# Patient Record
Sex: MALE | URBAN - METROPOLITAN AREA
[De-identification: names, ages, dates, MRNs, and addresses within clinical notes are randomized per-mention and may not be internally consistent; named-entity substitution may affect disease eponyms.]

---

## 2022-04-26 ENCOUNTER — OUT OF OFFICE VISIT (OUTPATIENT)
Dept: URBAN - METROPOLITAN AREA MEDICAL CENTER 9 | Facility: MEDICAL CENTER | Age: 67
End: 2022-04-26
Payer: MEDICARE

## 2022-04-26 DIAGNOSIS — D64.89 ANEMIA DUE TO OTHER CAUSE: ICD-10-CM

## 2022-04-26 DIAGNOSIS — K25.4 BLEEDING GASTRIC ULCER: ICD-10-CM

## 2022-04-26 DIAGNOSIS — K29.30 CHRONIC SUPERFICIAL GASTRITIS: ICD-10-CM

## 2022-04-26 DIAGNOSIS — D69.6 ACQUIRED AMEGAKARYOCYTIC THROMBOCYTOPENIA: ICD-10-CM

## 2022-04-26 DIAGNOSIS — K31.A12 INTESTINAL METAPLASIA OF BODY OF STOMACH WITHOUT DYSPLASIA: ICD-10-CM

## 2022-04-26 DIAGNOSIS — K29.80 ACUTE DUODENITIS: ICD-10-CM

## 2022-04-26 DIAGNOSIS — K92.1 ACUTE MELENA: ICD-10-CM

## 2022-04-26 DIAGNOSIS — K92.0 BLOODY EMESIS: ICD-10-CM

## 2022-04-26 PROCEDURE — 99221 1ST HOSP IP/OBS SF/LOW 40: CPT | Performed by: INTERNAL MEDICINE

## 2022-04-26 PROCEDURE — 43239 EGD BIOPSY SINGLE/MULTIPLE: CPT | Performed by: INTERNAL MEDICINE

## 2022-04-26 PROCEDURE — G8427 DOCREV CUR MEDS BY ELIG CLIN: HCPCS | Performed by: INTERNAL MEDICINE

## 2022-04-26 PROCEDURE — 43255 EGD CONTROL BLEEDING ANY: CPT | Performed by: INTERNAL MEDICINE

## 2022-04-26 PROCEDURE — 99232 SBSQ HOSP IP/OBS MODERATE 35: CPT | Performed by: INTERNAL MEDICINE

## 2022-04-29 ENCOUNTER — OUT OF OFFICE VISIT (OUTPATIENT)
Dept: URBAN - METROPOLITAN AREA MEDICAL CENTER 9 | Facility: MEDICAL CENTER | Age: 67
End: 2022-04-29
Payer: MEDICARE

## 2022-04-29 DIAGNOSIS — D69.6 ACQUIRED AMEGAKARYOCYTIC THROMBOCYTOPENIA: ICD-10-CM

## 2022-04-29 DIAGNOSIS — K25.4 BLEEDING GASTRIC ULCER: ICD-10-CM

## 2022-04-29 DIAGNOSIS — D64.89 ANEMIA DUE TO OTHER CAUSE: ICD-10-CM

## 2022-04-29 PROCEDURE — 99232 SBSQ HOSP IP/OBS MODERATE 35: CPT | Performed by: PHYSICIAN ASSISTANT

## 2022-06-23 ENCOUNTER — OFFICE VISIT (OUTPATIENT)
Dept: URBAN - METROPOLITAN AREA CLINIC 40 | Facility: CLINIC | Age: 67
End: 2022-06-23
Payer: MEDICARE

## 2022-06-23 ENCOUNTER — WEB ENCOUNTER (OUTPATIENT)
Dept: URBAN - METROPOLITAN AREA CLINIC 40 | Facility: CLINIC | Age: 67
End: 2022-06-23

## 2022-06-23 ENCOUNTER — LAB OUTSIDE AN ENCOUNTER (OUTPATIENT)
Dept: URBAN - METROPOLITAN AREA CLINIC 40 | Facility: CLINIC | Age: 67
End: 2022-06-23

## 2022-06-23 VITALS
DIASTOLIC BLOOD PRESSURE: 60 MMHG | WEIGHT: 123 LBS | HEIGHT: 76 IN | SYSTOLIC BLOOD PRESSURE: 108 MMHG | TEMPERATURE: 98 F | HEART RATE: 100 BPM | BODY MASS INDEX: 14.98 KG/M2

## 2022-06-23 DIAGNOSIS — R10.13 EPIGASTRIC ABDOMINAL PAIN: ICD-10-CM

## 2022-06-23 DIAGNOSIS — K25.4 CHRONIC GASTRIC ULCER WITH HEMORRHAGE: ICD-10-CM

## 2022-06-23 PROCEDURE — 99214 OFFICE O/P EST MOD 30 MIN: CPT | Performed by: INTERNAL MEDICINE

## 2022-06-23 RX ORDER — PANTOPRAZOLE SODIUM 40 MG/1
1 TABLET TABLET, DELAYED RELEASE ORAL ONCE A DAY
Qty: 90 | Refills: 3 | OUTPATIENT
Start: 2022-06-23

## 2022-06-23 RX ORDER — BUDESONIDE AND FORMOTEROL FUMARATE DIHYDRATE 160; 4.5 UG/1; UG/1
2 PUFFS AEROSOL RESPIRATORY (INHALATION) ONCE A DAY
Qty: 1 KIT | Refills: 3

## 2022-06-23 NOTE — HPI-TODAY'S VISIT:
66-year-old -American male here for follow-up after recent hospitalization for GI bleed.  He was in the hospital in April with melena and hematemesis.  Was noted to have with bleeding. had gastric ulcer.  Hemostasis was achieved with Hemoclip and epinephrine.  He has done well since discharge.  Just ran out of Protonix and Carafate.  He is in need of a new PCP as Dr. Keene does not accept his insurance anymore.  He has COPD.  Still smokes.  Not on oxygen.  Up-to-date on colonoscopy.  Colonoscopy in 2016 showed diverticulosis and internal hemorrhoids.

## 2022-07-15 PROBLEM — 57246001: Status: ACTIVE | Noted: 2022-06-23

## 2022-08-10 ENCOUNTER — OFFICE VISIT (OUTPATIENT)
Dept: URBAN - METROPOLITAN AREA SURGERY CENTER 30 | Facility: SURGERY CENTER | Age: 67
End: 2022-08-10
Payer: MEDICARE

## 2022-08-10 DIAGNOSIS — R12 BURNING REFLUX: ICD-10-CM

## 2022-08-10 DIAGNOSIS — K29.30 CHRONIC SUPERFICIAL GASTRITIS: ICD-10-CM

## 2022-08-10 PROCEDURE — G8907 PT DOC NO EVENTS ON DISCHARG: HCPCS | Performed by: INTERNAL MEDICINE

## 2022-08-10 PROCEDURE — 43239 EGD BIOPSY SINGLE/MULTIPLE: CPT | Performed by: INTERNAL MEDICINE

## 2022-10-05 ENCOUNTER — OFFICE VISIT (OUTPATIENT)
Dept: URBAN - METROPOLITAN AREA CLINIC 40 | Facility: CLINIC | Age: 67
End: 2022-10-05

## 2023-08-07 ENCOUNTER — CLAIMS CREATED FROM THE CLAIM WINDOW (OUTPATIENT)
Dept: URBAN - METROPOLITAN AREA MEDICAL CENTER 9 | Facility: MEDICAL CENTER | Age: 68
End: 2023-08-07

## 2023-08-07 ENCOUNTER — CLAIMS CREATED FROM THE CLAIM WINDOW (OUTPATIENT)
Dept: URBAN - METROPOLITAN AREA MEDICAL CENTER 9 | Facility: MEDICAL CENTER | Age: 68
End: 2023-08-07
Payer: MEDICARE

## 2023-08-07 DIAGNOSIS — K92.0 HEMATEMESIS: ICD-10-CM

## 2023-08-07 DIAGNOSIS — K25.9 GASTRIC ULCER, UNSPECIFIED AS ACUTE OR CHRONIC, WITHOUT HEMORRHAGE OR PERFORATION: ICD-10-CM

## 2023-08-07 DIAGNOSIS — K29.60 ADENOPAPILLOMATOSIS GASTRICA: ICD-10-CM

## 2023-08-07 DIAGNOSIS — K92.1 MELENA: ICD-10-CM

## 2023-08-07 DIAGNOSIS — Z87.11 H/O GASTRIC ULCER: ICD-10-CM

## 2023-08-07 DIAGNOSIS — K22.2 ACQUIRED ESOPHAGEAL RING: ICD-10-CM

## 2023-08-07 DIAGNOSIS — K25.9 ANTRAL ULCER: ICD-10-CM

## 2023-08-07 DIAGNOSIS — K29.70 GASTRITIS, UNSPECIFIED, WITHOUT BLEEDING: ICD-10-CM

## 2023-08-07 PROCEDURE — 43255 EGD CONTROL BLEEDING ANY: CPT | Performed by: INTERNAL MEDICINE

## 2023-08-07 PROCEDURE — 99222 1ST HOSP IP/OBS MODERATE 55: CPT | Performed by: INTERNAL MEDICINE

## 2023-08-07 PROCEDURE — 99254 IP/OBS CNSLTJ NEW/EST MOD 60: CPT | Performed by: INTERNAL MEDICINE

## 2023-08-07 PROCEDURE — 43239 EGD BIOPSY SINGLE/MULTIPLE: CPT | Performed by: INTERNAL MEDICINE

## 2023-08-07 PROCEDURE — 43236 UPPR GI SCOPE W/SUBMUC INJ: CPT | Performed by: INTERNAL MEDICINE

## 2023-08-07 PROCEDURE — G8427 DOCREV CUR MEDS BY ELIG CLIN: HCPCS | Performed by: INTERNAL MEDICINE

## 2023-08-08 ENCOUNTER — CLAIMS CREATED FROM THE CLAIM WINDOW (OUTPATIENT)
Dept: URBAN - METROPOLITAN AREA MEDICAL CENTER 9 | Facility: MEDICAL CENTER | Age: 68
End: 2023-08-08
Payer: MEDICARE

## 2023-08-08 DIAGNOSIS — K25.9 ANTRAL ULCER: ICD-10-CM

## 2023-08-08 DIAGNOSIS — K92.0 BLOODY EMESIS: ICD-10-CM

## 2023-08-08 DIAGNOSIS — K22.2 ACQUIRED ESOPHAGEAL RING: ICD-10-CM

## 2023-08-08 PROCEDURE — 99254 IP/OBS CNSLTJ NEW/EST MOD 60: CPT | Performed by: INTERNAL MEDICINE

## 2023-08-08 PROCEDURE — 99232 SBSQ HOSP IP/OBS MODERATE 35: CPT | Performed by: INTERNAL MEDICINE

## 2023-08-09 ENCOUNTER — TELEPHONE ENCOUNTER (OUTPATIENT)
Dept: URBAN - METROPOLITAN AREA CLINIC 74 | Facility: CLINIC | Age: 68
End: 2023-08-09

## 2023-08-11 ENCOUNTER — P2P PATIENT RECORD (OUTPATIENT)
Age: 68
End: 2023-08-11

## 2023-09-07 ENCOUNTER — OFFICE VISIT (OUTPATIENT)
Dept: URBAN - METROPOLITAN AREA CLINIC 40 | Facility: CLINIC | Age: 68
End: 2023-09-07

## 2023-09-07 RX ORDER — BUDESONIDE AND FORMOTEROL FUMARATE DIHYDRATE 160; 4.5 UG/1; UG/1
2 PUFFS AEROSOL RESPIRATORY (INHALATION) ONCE A DAY
Qty: 1 KIT | Refills: 3 | Status: ACTIVE | COMMUNITY

## 2023-09-07 RX ORDER — PANTOPRAZOLE SODIUM 40 MG/1
1 TABLET TABLET, DELAYED RELEASE ORAL ONCE A DAY
Qty: 90 | Refills: 3 | Status: ACTIVE | COMMUNITY
Start: 2022-06-23

## 2023-11-15 ENCOUNTER — DASHBOARD ENCOUNTERS (OUTPATIENT)
Age: 68
End: 2023-11-15

## 2023-11-16 ENCOUNTER — OFFICE VISIT (OUTPATIENT)
Dept: URBAN - METROPOLITAN AREA CLINIC 40 | Facility: CLINIC | Age: 68
End: 2023-11-16

## 2024-07-25 ENCOUNTER — CLAIMS CREATED FROM THE CLAIM WINDOW (OUTPATIENT)
Dept: URBAN - METROPOLITAN AREA MEDICAL CENTER 9 | Facility: MEDICAL CENTER | Age: 69
End: 2024-07-25
Payer: MEDICARE

## 2024-07-25 DIAGNOSIS — K92.0 HEMATEMESIS: ICD-10-CM

## 2024-07-25 DIAGNOSIS — D64.9 ANEMIA, UNSPECIFIED: ICD-10-CM

## 2024-07-25 DIAGNOSIS — K92.2 GASTROINTESTINAL HEMORRHAGE, UNSPECIFIED: ICD-10-CM

## 2024-07-25 DIAGNOSIS — K92.1 MELENA: ICD-10-CM

## 2024-07-25 PROCEDURE — 99254 IP/OBS CNSLTJ NEW/EST MOD 60: CPT | Performed by: INTERNAL MEDICINE

## 2024-07-25 PROCEDURE — 43255 EGD CONTROL BLEEDING ANY: CPT | Performed by: INTERNAL MEDICINE

## 2024-07-26 ENCOUNTER — CLAIMS CREATED FROM THE CLAIM WINDOW (OUTPATIENT)
Dept: URBAN - METROPOLITAN AREA MEDICAL CENTER 9 | Facility: MEDICAL CENTER | Age: 69
End: 2024-07-26
Payer: MEDICARE

## 2024-07-26 DIAGNOSIS — K27.0 ACUTE PEPTIC ULCER, SITE UNSPECIFIED, WITH HEMORRHAGE: ICD-10-CM

## 2024-07-26 DIAGNOSIS — K92.0 HEMATEMESIS: ICD-10-CM

## 2024-07-26 DIAGNOSIS — D64.9 ANEMIA, UNSPECIFIED: ICD-10-CM

## 2024-07-26 PROCEDURE — 99231 SBSQ HOSP IP/OBS SF/LOW 25: CPT | Performed by: PHYSICIAN ASSISTANT

## 2024-07-29 ENCOUNTER — CLAIMS CREATED FROM THE CLAIM WINDOW (OUTPATIENT)
Dept: URBAN - METROPOLITAN AREA MEDICAL CENTER 9 | Facility: MEDICAL CENTER | Age: 69
End: 2024-07-29

## 2024-07-29 PROCEDURE — 99231 SBSQ HOSP IP/OBS SF/LOW 25: CPT | Performed by: PHYSICIAN ASSISTANT

## 2024-07-30 ENCOUNTER — CLAIMS CREATED FROM THE CLAIM WINDOW (OUTPATIENT)
Dept: URBAN - METROPOLITAN AREA MEDICAL CENTER 9 | Facility: MEDICAL CENTER | Age: 69
End: 2024-07-30
Payer: MEDICARE

## 2024-07-30 DIAGNOSIS — K59.00 CONSTIPATION, UNSPECIFIED: ICD-10-CM

## 2024-07-30 DIAGNOSIS — D64.9 ANEMIA, UNSPECIFIED: ICD-10-CM

## 2024-07-30 DIAGNOSIS — K27.3 ACUTE PEPTIC ULCER, SITE UNSPECIFIED, WITHOUT HEMORRHAGE OR PERFORATION: ICD-10-CM

## 2024-07-30 PROCEDURE — 99231 SBSQ HOSP IP/OBS SF/LOW 25: CPT | Performed by: PHYSICIAN ASSISTANT

## 2024-09-30 ENCOUNTER — OFFICE VISIT (OUTPATIENT)
Dept: URBAN - METROPOLITAN AREA CLINIC 40 | Facility: CLINIC | Age: 69
End: 2024-09-30

## 2024-09-30 RX ORDER — PANTOPRAZOLE SODIUM 40 MG/1
1 TABLET TABLET, DELAYED RELEASE ORAL ONCE A DAY
Qty: 90 | Refills: 3 | Status: ACTIVE | COMMUNITY
Start: 2022-06-23

## 2024-09-30 RX ORDER — BUDESONIDE AND FORMOTEROL FUMARATE DIHYDRATE 160; 4.5 UG/1; UG/1
2 PUFFS AEROSOL RESPIRATORY (INHALATION) ONCE A DAY
Qty: 1 KIT | Refills: 3 | Status: ACTIVE | COMMUNITY

## 2024-11-12 ENCOUNTER — OFFICE VISIT (OUTPATIENT)
Dept: URBAN - METROPOLITAN AREA CLINIC 40 | Facility: CLINIC | Age: 69
End: 2024-11-12

## 2024-11-15 ENCOUNTER — CLAIMS CREATED FROM THE CLAIM WINDOW (OUTPATIENT)
Dept: URBAN - METROPOLITAN AREA MEDICAL CENTER 9 | Facility: MEDICAL CENTER | Age: 69
End: 2024-11-15
Payer: COMMERCIAL

## 2024-11-15 DIAGNOSIS — K25.4 CHRONIC OR UNSPECIFIED GASTRIC ULCER WITH HEMORRHAGE: ICD-10-CM

## 2024-11-15 DIAGNOSIS — K25.4 BLEEDING CHRONIC GASTRIC ULCER: ICD-10-CM

## 2024-11-15 DIAGNOSIS — K92.0 HEMATEMESIS: ICD-10-CM

## 2024-11-15 DIAGNOSIS — K92.2 GASTROINTESTINAL HEMORRHAGE, UNSPECIFIED: ICD-10-CM

## 2024-11-15 DIAGNOSIS — K92.1 MELENA: ICD-10-CM

## 2024-11-15 DIAGNOSIS — K22.2 ACQUIRED ESOPHAGEAL RING: ICD-10-CM

## 2024-11-15 PROCEDURE — 99254 IP/OBS CNSLTJ NEW/EST MOD 60: CPT

## 2024-11-15 PROCEDURE — 43255 EGD CONTROL BLEEDING ANY: CPT | Performed by: INTERNAL MEDICINE

## 2024-11-16 ENCOUNTER — CLAIMS CREATED FROM THE CLAIM WINDOW (OUTPATIENT)
Dept: URBAN - METROPOLITAN AREA MEDICAL CENTER 9 | Facility: MEDICAL CENTER | Age: 69
End: 2024-11-16
Payer: COMMERCIAL

## 2024-11-16 DIAGNOSIS — K25.4 BLEEDING CHRONIC GASTRIC ULCER: ICD-10-CM

## 2024-11-16 PROCEDURE — 99232 SBSQ HOSP IP/OBS MODERATE 35: CPT | Performed by: INTERNAL MEDICINE

## 2024-11-17 ENCOUNTER — CLAIMS CREATED FROM THE CLAIM WINDOW (OUTPATIENT)
Dept: URBAN - METROPOLITAN AREA MEDICAL CENTER 9 | Facility: MEDICAL CENTER | Age: 69
End: 2024-11-17
Payer: COMMERCIAL

## 2024-11-17 DIAGNOSIS — K25.4 BLEEDING CHRONIC GASTRIC ULCER: ICD-10-CM

## 2024-11-17 PROCEDURE — 99232 SBSQ HOSP IP/OBS MODERATE 35: CPT | Performed by: INTERNAL MEDICINE

## 2024-11-18 ENCOUNTER — CLAIMS CREATED FROM THE CLAIM WINDOW (OUTPATIENT)
Dept: URBAN - METROPOLITAN AREA MEDICAL CENTER 9 | Facility: MEDICAL CENTER | Age: 69
End: 2024-11-18
Payer: COMMERCIAL

## 2024-11-18 DIAGNOSIS — D64.9 ANEMIA, UNSPECIFIED: ICD-10-CM

## 2024-11-18 DIAGNOSIS — Z87.11 PERSONAL HISTORY OF PEPTIC ULCER DISEASE: ICD-10-CM

## 2024-11-18 DIAGNOSIS — K92.1 MELENA: ICD-10-CM

## 2024-11-18 PROCEDURE — 99232 SBSQ HOSP IP/OBS MODERATE 35: CPT | Performed by: PHYSICIAN ASSISTANT

## 2024-11-22 ENCOUNTER — TELEPHONE ENCOUNTER (OUTPATIENT)
Dept: URBAN - METROPOLITAN AREA CLINIC 40 | Facility: CLINIC | Age: 69
End: 2024-11-22

## 2024-11-27 ENCOUNTER — OFFICE VISIT (OUTPATIENT)
Dept: URBAN - METROPOLITAN AREA CLINIC 40 | Facility: CLINIC | Age: 69
End: 2024-11-27

## 2024-12-06 ENCOUNTER — TELEPHONE ENCOUNTER (OUTPATIENT)
Dept: URBAN - METROPOLITAN AREA CLINIC 40 | Facility: CLINIC | Age: 69
End: 2024-12-06

## 2024-12-11 ENCOUNTER — OFFICE VISIT (OUTPATIENT)
Dept: URBAN - METROPOLITAN AREA CLINIC 40 | Facility: CLINIC | Age: 69
End: 2024-12-11

## 2024-12-11 RX ORDER — PANTOPRAZOLE SODIUM 40 MG/1
1 TABLET 1/2 TO 1 HOUR BEFORE MORNING MEAL TABLET, DELAYED RELEASE ORAL ONCE A DAY
Status: ACTIVE | COMMUNITY

## 2024-12-11 RX ORDER — BUDESONIDE AND FORMOTEROL FUMARATE DIHYDRATE 160; 4.5 UG/1; UG/1
AS DIRECTED AEROSOL RESPIRATORY (INHALATION)
Status: ACTIVE | COMMUNITY

## 2024-12-11 RX ORDER — SUCRALFATE 1 G/1
1 TABLET ON AN EMPTY STOMACH TABLET ORAL TWICE A DAY
Status: ACTIVE | COMMUNITY

## 2024-12-11 RX ORDER — MIDODRINE HYDROCHLORIDE 10 MG/1
1 TABLET TABLET ORAL TWICE A DAY
Status: ACTIVE | COMMUNITY

## 2024-12-12 ENCOUNTER — TELEPHONE ENCOUNTER (OUTPATIENT)
Dept: URBAN - METROPOLITAN AREA CLINIC 5 | Facility: CLINIC | Age: 69
End: 2024-12-12

## 2024-12-26 ENCOUNTER — TELEPHONE ENCOUNTER (OUTPATIENT)
Dept: URBAN - METROPOLITAN AREA CLINIC 74 | Facility: CLINIC | Age: 69
End: 2024-12-26

## 2024-12-26 ENCOUNTER — OFFICE VISIT (OUTPATIENT)
Dept: URBAN - METROPOLITAN AREA CLINIC 40 | Facility: CLINIC | Age: 69
End: 2024-12-26
Payer: MEDICARE

## 2024-12-26 VITALS
SYSTOLIC BLOOD PRESSURE: 142 MMHG | WEIGHT: 127.2 LBS | HEIGHT: 76 IN | TEMPERATURE: 97.7 F | BODY MASS INDEX: 15.49 KG/M2 | DIASTOLIC BLOOD PRESSURE: 90 MMHG | HEART RATE: 102 BPM

## 2024-12-26 DIAGNOSIS — K29.21 CHRONIC ALCOHOLIC GASTRITIS WITH HEMORRHAGE: ICD-10-CM

## 2024-12-26 DIAGNOSIS — K25.4 CHRONIC GASTRIC ULCER WITH HEMORRHAGE: ICD-10-CM

## 2024-12-26 PROBLEM — 40241000119109: Status: ACTIVE | Noted: 2024-12-26

## 2024-12-26 PROCEDURE — 99204 OFFICE O/P NEW MOD 45 MIN: CPT

## 2024-12-26 RX ORDER — SUCRALFATE 1 G/1
1 TABLET ON AN EMPTY STOMACH TABLET ORAL TWICE A DAY
COMMUNITY

## 2024-12-26 RX ORDER — PANTOPRAZOLE SODIUM 40 MG/1
1 TABLET TABLET, DELAYED RELEASE ORAL ONCE A DAY
Qty: 90 | Refills: 3 | COMMUNITY
Start: 2022-06-23

## 2024-12-26 RX ORDER — SUCRALFATE 1 G/1
1 TABLET ON AN EMPTY STOMACH TABLET ORAL TWICE A DAY
Qty: 60 | Refills: 3 | OUTPATIENT
Start: 2024-12-26 | End: 2025-04-25

## 2024-12-26 RX ORDER — BUDESONIDE AND FORMOTEROL FUMARATE DIHYDRATE 160; 4.5 UG/1; UG/1
2 PUFFS AEROSOL RESPIRATORY (INHALATION) ONCE A DAY
Qty: 1 KIT | Refills: 3 | COMMUNITY

## 2024-12-26 RX ORDER — BUDESONIDE AND FORMOTEROL FUMARATE DIHYDRATE 160; 4.5 UG/1; UG/1
AEROSOL RESPIRATORY (INHALATION)
Qty: 10.2 GRAM | Status: ACTIVE | COMMUNITY

## 2024-12-26 RX ORDER — MIDODRINE HYDROCHLORIDE 10 MG/1
1 TABLET TABLET ORAL TWICE A DAY
COMMUNITY

## 2024-12-26 RX ORDER — PANTOPRAZOLE SODIUM 40 MG/1
1 TABLET TABLET, DELAYED RELEASE ORAL TWICE A DAY
Qty: 60 TABLET | Refills: 3 | OUTPATIENT
Start: 2024-12-26

## 2024-12-26 RX ORDER — ALBUTEROL SULFATE 90 UG/1
AEROSOL, METERED RESPIRATORY (INHALATION)
Qty: 18 GRAM | Status: ACTIVE | COMMUNITY

## 2024-12-26 RX ORDER — BUDESONIDE AND FORMOTEROL FUMARATE DIHYDRATE 160; 4.5 UG/1; UG/1
AS DIRECTED AEROSOL RESPIRATORY (INHALATION)
COMMUNITY

## 2024-12-26 RX ORDER — PANTOPRAZOLE SODIUM 40 MG/1
1 TABLET 1/2 TO 1 HOUR BEFORE MORNING MEAL TABLET, DELAYED RELEASE ORAL ONCE A DAY
COMMUNITY

## 2024-12-26 NOTE — PHYSICAL EXAM CHEST:
chest wall non-tender, breathing is unlabored without accessory muscle use, normal breath sounds, 2L NC in place

## 2024-12-26 NOTE — PHYSICAL EXAM GASTROINTESTINAL
Abdomen , soft, mild TTP of mid epigastric, nondistended , no guarding or rigidity , no masses palpable , normal bowel sounds , Liver and Spleen,  no hepatosplenomegaly , liver nontender

## 2024-12-26 NOTE — HPI-TODAY'S VISIT:
Malvin Verdugo is a 68 y.o. male with hx of recurrent gastric ulcer bleeding. The patient with a known history of Asthma, COPD, Prostate cancer, S/P Radiation therapy, Osteomyelitis, Bilateral Great toes amputation, Diverticulum of the cecum, Internal Hemorrhoids, Gastritis, Gastric Intestinal Metaplasia, Gastric Ulcers, S/P Clip & APC, Duodenitis, Chronic pain syndrome, Anemia of chronic disease, Fe deficiency anemia, History of Rib fracture, OA, Current ETOH and Tobacco use. Has continued to smoke cigarettes and drink about 3 beers daily. No Illicit drug abuse. He denies using NSAID's or aspirin use. Patient has hx of non-compliance with PPI and Carafate. Seen by our service inpatient July and November this year. With OP follow up canceled multiple times by patient. Pt lives alone in a boarding house, no family in the area.   -- Patient started on 2L oxygen for home use at discharge. Hx of COPD. No follow up with PCP or pulmonology  -- Surgery was consulted for possible gastric antrectomy. Surgical not recommended until patient stops smoking and alcohol use.  -- Patient reports he stopped alcohol use since discharge from hospital. Continues to smoke about 2-3 cigarettes daily. Reports compliance with medications since discharge. No vomiting, nausea, dark stools. Some intermittent mid epigastric pain.   Procedures: -- EGD with hemostasis 11/15/2024, Dr. Clark: Non-obstructing Schatzki ring. Oozing gastric ulcer with a flat pigmented spot (Justin Class IIc).          Hemostatic spray applied.  Injected.  Clips were placed. Clotted blood in the gastric fundus. No gross lesions in the entire examined duodenum. No specimens collected. -- EGD with hemostasis 07/25/2024 by Dr. Mitchell. One large non-obstructing oozing cratered gastric ulcer of significant severity with an adherent clot (Justin Class IIb) was found in the prepyloric region of the stomach. The lesion was 30 mm in the largest dimension. Blood clot was removed with snare. Actively oozing vessel partially identified. Hemostasis achieved with epinephrine injections, two treatments of bipolar probe, and Hemospray.   -- EGD with hemostasis 8/7/2023 by Dr. Clark. Large gastric ulcer 1.5cm with active bleeding requiring hemospray, no ablation performed. Hiatal hernia, Schatzki ring. Biopsy negative for H.pylori. -- EGD with Biopsy on 08/10/2022 by Dr. Phillip noted normal esophagus. Chronic gastritis. Normal examined duodenum. A small amount of food in stomach. Biopsy with chronic gastritis with reactive changes. No H. Pylori organisms.   -- EGD with biopsy on 04/26/2022 by Dr. Phillip noted normal esophagus. Chronic gastritis. Oozing gastric ulcer with oozing hemorrhage (Justin Class Ib).  Injected.Clip was placed.  Clip : [employer]. One gastric ulcer s/p epinephrine/heater probe. Non-bleeding gastric ulcers with no stigmata of bleeding. Chronic duodenitis. Biopsy with chronic gastritis with focal intestinal metaplasia. No H. Pylori organisms.   -- Colonoscopy with biopsy on 06/05/2015 by Dr. Clark noted diverticulosis in the cecum. Internal hemorrhoids, Biopsy with normal colonic mucosa with no histological abnormality. Repeat Colonoscopy for surveillance in 10 years.

## 2025-01-29 ENCOUNTER — OFFICE VISIT (OUTPATIENT)
Dept: URBAN - METROPOLITAN AREA CLINIC 40 | Facility: CLINIC | Age: 70
End: 2025-01-29

## 2025-01-29 RX ORDER — PANTOPRAZOLE SODIUM 40 MG/1
1 TABLET TABLET, DELAYED RELEASE ORAL TWICE A DAY
Qty: 60 TABLET | Refills: 3 | OUTPATIENT

## 2025-01-29 RX ORDER — SUCRALFATE 1 G/1
1 TABLET ON AN EMPTY STOMACH TABLET ORAL TWICE A DAY
Qty: 60 | Refills: 3 | COMMUNITY
Start: 2024-12-26 | End: 2025-04-25

## 2025-01-29 RX ORDER — SUCRALFATE 1 G/1
1 TABLET ON AN EMPTY STOMACH TABLET ORAL TWICE A DAY
COMMUNITY

## 2025-01-29 RX ORDER — ALBUTEROL SULFATE 90 UG/1
AEROSOL, METERED RESPIRATORY (INHALATION)
Qty: 18 GRAM | COMMUNITY

## 2025-01-29 RX ORDER — BUDESONIDE AND FORMOTEROL FUMARATE DIHYDRATE 160; 4.5 UG/1; UG/1
AS DIRECTED AEROSOL RESPIRATORY (INHALATION)
COMMUNITY

## 2025-01-29 RX ORDER — BUDESONIDE AND FORMOTEROL FUMARATE DIHYDRATE 160; 4.5 UG/1; UG/1
2 PUFFS AEROSOL RESPIRATORY (INHALATION) ONCE A DAY
Qty: 1 KIT | Refills: 3 | COMMUNITY

## 2025-01-29 RX ORDER — PANTOPRAZOLE SODIUM 40 MG/1
1 TABLET 1/2 TO 1 HOUR BEFORE MORNING MEAL TABLET, DELAYED RELEASE ORAL ONCE A DAY
COMMUNITY

## 2025-01-29 RX ORDER — SUCRALFATE 1 G/1
1 TABLET ON AN EMPTY STOMACH TABLET ORAL TWICE A DAY
Qty: 60 | Refills: 3 | OUTPATIENT

## 2025-01-29 RX ORDER — MIDODRINE HYDROCHLORIDE 10 MG/1
1 TABLET TABLET ORAL TWICE A DAY
COMMUNITY

## 2025-01-29 RX ORDER — PANTOPRAZOLE SODIUM 40 MG/1
1 TABLET TABLET, DELAYED RELEASE ORAL TWICE A DAY
Qty: 60 TABLET | Refills: 3 | COMMUNITY
Start: 2024-12-26

## 2025-01-29 RX ORDER — PANTOPRAZOLE SODIUM 40 MG/1
1 TABLET TABLET, DELAYED RELEASE ORAL ONCE A DAY
Qty: 90 | Refills: 3 | COMMUNITY
Start: 2022-06-23

## 2025-01-29 RX ORDER — BUDESONIDE AND FORMOTEROL FUMARATE DIHYDRATE 160; 4.5 UG/1; UG/1
AEROSOL RESPIRATORY (INHALATION)
Qty: 10.2 GRAM | COMMUNITY

## 2025-01-29 NOTE — HPI-TODAY'S VISIT:
Malvin Verdugo is a 68 y.o. male with hx of recurrent gastric ulcer bleeding. The patient with a known history of Asthma, COPD, Prostate cancer, S/P Radiation therapy, Osteomyelitis, Bilateral Great toes amputation, Diverticulum of the cecum, Internal Hemorrhoids, Gastritis, Gastric Intestinal Metaplasia, Gastric Ulcers, S/P Clip & APC, Duodenitis, Chronic pain syndrome, Anemia of chronic disease, Fe deficiency anemia, History of Rib fracture, OA, Current ETOH and Tobacco use. Has continued to smoke cigarettes and drink about 3 beers daily. No Illicit drug abuse. He denies using NSAID's or aspirin use. Patient has hx of non-compliance with PPI and Carafate. Seen by our service inpatient July and November this year. With OP follow up canceled multiple times by patient. Pt lives alone in a boarding house, no family in the area.   -- Patient started on 2L oxygen for home use at discharge. Hx of COPD. No follow up with PCP or pulmonology  -- Surgery was consulted for possible gastric antrectomy. Surgical not recommended until patient stops smoking and alcohol use.  -- Patient reports he stopped alcohol use since discharge from hospital. Continues to smoke about 2-3 cigarettes daily. Reports compliance with medications since discharge. No vomiting, nausea, dark stools. Some intermittent mid epigastric pain.   Procedures: -- EGD with hemostasis 11/15/2024, Dr. Clark: Non-obstructing Schatzki ring. Oozing gastric ulcer with a flat pigmented spot (Justin Class IIc).          Hemostatic spray applied.  Injected.  Clips were placed. Clotted blood in the gastric fundus. No gross lesions in the entire examined duodenum. No specimens collected. -- EGD with hemostasis 07/25/2024 by Dr. Mitchell. One large non-obstructing oozing cratered gastric ulcer of significant severity with an adherent clot (Justin Class IIb) was found in the prepyloric region of the stomach. The lesion was 30 mm in the largest dimension. Blood clot was removed with snare. Actively oozing vessel partially identified. Hemostasis achieved with epinephrine injections, two treatments of bipolar probe, and Hemospray.   -- EGD with hemostasis 8/7/2023 by Dr. Clark. Large gastric ulcer 1.5cm with active bleeding requiring hemospray, no ablation performed. Hiatal hernia, Schatzki ring. Biopsy negative for H.pylori. -- EGD with Biopsy on 08/10/2022 by Dr. Phillip noted normal esophagus. Chronic gastritis. Normal examined duodenum. A small amount of food in stomach. Biopsy with chronic gastritis with reactive changes. No H. Pylori organisms.   -- EGD with biopsy on 04/26/2022 by Dr. Phillip noted normal esophagus. Chronic gastritis. Oozing gastric ulcer with oozing hemorrhage (Justin Class Ib).  Injected.Clip was placed.  Clip : [employer]. One gastric ulcer s/p epinephrine/heater probe. Non-bleeding gastric ulcers with no stigmata of bleeding. Chronic duodenitis. Biopsy with chronic gastritis with focal intestinal metaplasia. No H. Pylori organisms.   -- Colonoscopy with biopsy on 06/05/2015 by Dr. Clark noted diverticulosis in the cecum. Internal hemorrhoids, Biopsy with normal colonic mucosa with no histological abnormality. Repeat Colonoscopy for surveillance in 10 years.

## 2025-02-13 ENCOUNTER — OFFICE VISIT (OUTPATIENT)
Dept: URBAN - METROPOLITAN AREA CLINIC 40 | Facility: CLINIC | Age: 70
End: 2025-02-13
Payer: MEDICARE

## 2025-02-13 VITALS
BODY MASS INDEX: 15.56 KG/M2 | WEIGHT: 127.8 LBS | OXYGEN SATURATION: 90 % | TEMPERATURE: 97.9 F | HEIGHT: 76 IN | SYSTOLIC BLOOD PRESSURE: 110 MMHG | DIASTOLIC BLOOD PRESSURE: 62 MMHG | HEART RATE: 103 BPM

## 2025-02-13 DIAGNOSIS — K29.20 CHRONIC ALCOHOLIC GASTRITIS WITHOUT HEMORRHAGE: ICD-10-CM

## 2025-02-13 DIAGNOSIS — D50.9 IRON DEFICIENCY ANEMIA, UNSPECIFIED IRON DEFICIENCY ANEMIA TYPE: ICD-10-CM

## 2025-02-13 DIAGNOSIS — K25.7 CHRONIC GASTRIC ULCER WITHOUT HEMORRHAGE AND WITHOUT PERFORATION: ICD-10-CM

## 2025-02-13 PROBLEM — 76796008: Status: ACTIVE | Noted: 2025-02-13

## 2025-02-13 PROBLEM — 2043009: Status: ACTIVE | Noted: 2025-02-13

## 2025-02-13 PROBLEM — 87522002: Status: ACTIVE | Noted: 2025-02-13

## 2025-02-13 PROCEDURE — 99213 OFFICE O/P EST LOW 20 MIN: CPT

## 2025-02-13 RX ORDER — PANTOPRAZOLE SODIUM 40 MG/1
1 TABLET 1/2 TO 1 HOUR BEFORE MORNING MEAL TABLET, DELAYED RELEASE ORAL ONCE A DAY
Qty: 90 TABLET | Refills: 1 | OUTPATIENT

## 2025-02-13 RX ORDER — SUCRALFATE 1 G/1
1 TABLET ON AN EMPTY STOMACH TABLET ORAL TWICE A DAY
Qty: 60 | Refills: 3 | Status: ACTIVE | COMMUNITY

## 2025-02-13 RX ORDER — MIDODRINE HYDROCHLORIDE 10 MG/1
1 TABLET TABLET ORAL TWICE A DAY
Status: ACTIVE | COMMUNITY

## 2025-02-13 RX ORDER — PANTOPRAZOLE SODIUM 40 MG/1
1 TABLET TABLET, DELAYED RELEASE ORAL ONCE A DAY
Qty: 90 | Refills: 3 | Status: DISCONTINUED | COMMUNITY
Start: 2022-06-23

## 2025-02-13 RX ORDER — BUDESONIDE AND FORMOTEROL FUMARATE DIHYDRATE 160; 4.5 UG/1; UG/1
AS DIRECTED AEROSOL RESPIRATORY (INHALATION)
Status: DISCONTINUED | COMMUNITY

## 2025-02-13 RX ORDER — BUDESONIDE AND FORMOTEROL FUMARATE DIHYDRATE 160; 4.5 UG/1; UG/1
2 PUFFS AEROSOL RESPIRATORY (INHALATION) ONCE A DAY
Qty: 1 KIT | Refills: 3 | Status: ACTIVE | COMMUNITY

## 2025-02-13 RX ORDER — PANTOPRAZOLE SODIUM 40 MG/1
1 TABLET 1/2 TO 1 HOUR BEFORE MORNING MEAL TABLET, DELAYED RELEASE ORAL ONCE A DAY
Status: ACTIVE | COMMUNITY

## 2025-02-13 RX ORDER — ALBUTEROL SULFATE 90 UG/1
AEROSOL, METERED RESPIRATORY (INHALATION)
Qty: 18 GRAM | Status: ACTIVE | COMMUNITY

## 2025-02-13 RX ORDER — BUDESONIDE AND FORMOTEROL FUMARATE DIHYDRATE 160; 4.5 UG/1; UG/1
AEROSOL RESPIRATORY (INHALATION)
Qty: 10.2 GRAM | Status: ACTIVE | COMMUNITY

## 2025-02-13 RX ORDER — PANTOPRAZOLE SODIUM 40 MG/1
1 TABLET TABLET, DELAYED RELEASE ORAL TWICE A DAY
Qty: 60 TABLET | Refills: 3 | Status: ACTIVE | COMMUNITY

## 2025-02-13 RX ORDER — SUCRALFATE 1 G/1
1 TABLET ON AN EMPTY STOMACH TABLET ORAL TWICE A DAY
Status: ACTIVE | COMMUNITY

## 2025-02-13 NOTE — HPI-TODAY'S VISIT:
Malvin Verdugo is a 68 y.o. male with hx of recurrent gastric ulcer bleeding. The patient with a known history of Asthma, COPD, Prostate cancer, S/P Radiation therapy, Osteomyelitis, Bilateral Great toes amputation, Diverticulum of the cecum, Internal Hemorrhoids, Gastritis, Gastric Intestinal Metaplasia, Gastric Ulcers, S/P Clip & APC, Duodenitis, Chronic pain syndrome, Anemia of chronic disease, Fe deficiency anemia, History of Rib fracture, OA, former ETOH and current Tobacco use. No Illicit drug abuse. He denies using NSAID's or aspirin use.   -- Hx of COPD on continuous 2L NC. Follows with [facility] -- Recent dx of CHF, EF decreased at  31-35%, has not yet followed up with cardio -- Surgery was consulted for possible gastric antrectomy. Surgical not recommended until patient stops smoking and alcohol use.  -- Patient reports he stopped alcohol use since discharge from hospital. Smoking has decreased to a few cigarettes a week. Reports compliance with medications since discharge. No vomiting, nausea, dark stools, abd pain.  Procedures -- EGD with hemostasis 11/15/2024, Dr. Clark: Non-obstructing Schatzki ring. Oozing gastric ulcer with a flat pigmented spot (Justin Class IIc).          Hemostatic spray applied.  Injected.  Clips were placed. Clotted blood in the gastric fundus. No gross lesions in the entire examined duodenum. No specimens collected. -- EGD with hemostasis 07/25/2024 by Dr. Mitchell. One large non-obstructing oozing cratered gastric ulcer of significant severity with an adherent clot (Justin Class IIb) was found in the prepyloric region of the stomach. The lesion was 30 mm in the largest dimension. Blood clot was removed with snare. Actively oozing vessel partially identified. Hemostasis achieved with epinephrine injections, two treatments of bipolar probe, and Hemospray.   -- EGD with hemostasis 8/7/2023 by Dr. Clark. Large gastric ulcer 1.5cm with active bleeding requiring hemospray, no ablation performed. Hiatal hernia, Schatzki ring. Biopsy negative for H.pylori. -- EGD with Biopsy on 08/10/2022 by Dr. Phillip noted normal esophagus. Chronic gastritis. Normal examined duodenum. A small amount of food in stomach. Biopsy with chronic gastritis with reactive changes. No H. Pylori organisms.   -- EGD with biopsy on 04/26/2022 by Dr. Phillip noted normal esophagus. Chronic gastritis. Oozing gastric ulcer with oozing hemorrhage (Justin Class Ib).  Injected.Clip was placed.  Clip : [employer]. One gastric ulcer s/p epinephrine/heater probe. Non-bleeding gastric ulcers with no stigmata of bleeding. Chronic duodenitis. Biopsy with chronic gastritis with focal intestinal metaplasia. No H. Pylori organisms.   -- Colonoscopy with biopsy on 06/05/2015 by Dr. Clark noted diverticulosis in the cecum. Internal hemorrhoids, Biopsy with normal colonic mucosa with no histological abnormality. Repeat Colonoscopy for surveillance in 10 years.

## 2025-03-16 ENCOUNTER — CLAIMS CREATED FROM THE CLAIM WINDOW (OUTPATIENT)
Dept: URBAN - METROPOLITAN AREA MEDICAL CENTER 9 | Facility: MEDICAL CENTER | Age: 70
End: 2025-03-16
Payer: MEDICARE

## 2025-03-16 DIAGNOSIS — J18.8 OTHER PNEUMONIA, UNSPECIFIED ORGANISM: ICD-10-CM

## 2025-03-16 DIAGNOSIS — D64.89 ANEMIA DUE TO OTHER CAUSE: ICD-10-CM

## 2025-03-16 DIAGNOSIS — Z87.11 H/O GASTRIC ULCER: ICD-10-CM

## 2025-03-16 PROCEDURE — 99254 IP/OBS CNSLTJ NEW/EST MOD 60: CPT | Performed by: INTERNAL MEDICINE

## 2025-03-16 PROCEDURE — G8427 DOCREV CUR MEDS BY ELIG CLIN: HCPCS | Performed by: INTERNAL MEDICINE

## 2025-03-16 PROCEDURE — 99222 1ST HOSP IP/OBS MODERATE 55: CPT | Performed by: INTERNAL MEDICINE

## 2025-03-17 ENCOUNTER — CLAIMS CREATED FROM THE CLAIM WINDOW (OUTPATIENT)
Dept: URBAN - METROPOLITAN AREA MEDICAL CENTER 9 | Facility: MEDICAL CENTER | Age: 70
End: 2025-03-17
Payer: MEDICARE

## 2025-03-17 DIAGNOSIS — Z87.11 H/O GASTRIC ULCER: ICD-10-CM

## 2025-03-17 DIAGNOSIS — D64.89 ANEMIA DUE TO OTHER CAUSE: ICD-10-CM

## 2025-03-17 DIAGNOSIS — J18.8 OTHER PNEUMONIA, UNSPECIFIED ORGANISM: ICD-10-CM

## 2025-03-17 PROCEDURE — 99232 SBSQ HOSP IP/OBS MODERATE 35: CPT | Performed by: INTERNAL MEDICINE

## 2025-03-18 ENCOUNTER — TELEPHONE ENCOUNTER (OUTPATIENT)
Dept: URBAN - METROPOLITAN AREA CLINIC 40 | Facility: CLINIC | Age: 70
End: 2025-03-18

## 2025-03-18 ENCOUNTER — CLAIMS CREATED FROM THE CLAIM WINDOW (OUTPATIENT)
Dept: URBAN - METROPOLITAN AREA MEDICAL CENTER 9 | Facility: MEDICAL CENTER | Age: 70
End: 2025-03-18
Payer: MEDICARE

## 2025-03-18 DIAGNOSIS — K31.A11 GASTRIC INTESTINAL METAPLASIA WITHOUT DYSPLASIA, INVOLVING THE ANTRUM: ICD-10-CM

## 2025-03-18 DIAGNOSIS — D50.9 ANEMIA: ICD-10-CM

## 2025-03-18 DIAGNOSIS — K29.60 ADENOPAPILLOMATOSIS GASTRICA: ICD-10-CM

## 2025-03-18 PROCEDURE — 43239 EGD BIOPSY SINGLE/MULTIPLE: CPT | Performed by: INTERNAL MEDICINE

## 2025-03-18 PROCEDURE — 43235 EGD DIAGNOSTIC BRUSH WASH: CPT | Performed by: INTERNAL MEDICINE

## 2025-04-08 ENCOUNTER — TELEPHONE ENCOUNTER (OUTPATIENT)
Dept: URBAN - METROPOLITAN AREA CLINIC 40 | Facility: CLINIC | Age: 70
End: 2025-04-08

## 2025-04-08 RX ORDER — FOLIC ACID 0.4 MG
1 TABLET TABLET ORAL ONCE A DAY
Qty: 30 | OUTPATIENT
Start: 2025-04-08

## 2025-04-08 RX ORDER — SUCRALFATE 1 G/1
1 TABLET ON AN EMPTY STOMACH TABLET ORAL TWICE A DAY
Qty: 60 | Refills: 2 | OUTPATIENT
Start: 2025-04-08

## 2025-04-21 ENCOUNTER — OFFICE VISIT (OUTPATIENT)
Dept: URBAN - METROPOLITAN AREA CLINIC 40 | Facility: CLINIC | Age: 70
End: 2025-04-21
Payer: MEDICARE

## 2025-04-21 ENCOUNTER — OFFICE VISIT (OUTPATIENT)
Dept: URBAN - METROPOLITAN AREA CLINIC 74 | Facility: CLINIC | Age: 70
End: 2025-04-21

## 2025-04-21 DIAGNOSIS — D50.9 IRON DEFICIENCY ANEMIA, UNSPECIFIED IRON DEFICIENCY ANEMIA TYPE: ICD-10-CM

## 2025-04-21 DIAGNOSIS — K29.20 CHRONIC ALCOHOLIC GASTRITIS WITHOUT HEMORRHAGE: ICD-10-CM

## 2025-04-21 DIAGNOSIS — K25.7 CHRONIC GASTRIC ULCER WITHOUT HEMORRHAGE AND WITHOUT PERFORATION: ICD-10-CM

## 2025-04-21 PROCEDURE — 99213 OFFICE O/P EST LOW 20 MIN: CPT | Performed by: PHYSICIAN ASSISTANT

## 2025-04-21 RX ORDER — FOLIC ACID 0.4 MG
1 TABLET TABLET ORAL ONCE A DAY
Qty: 30 | Status: ACTIVE | COMMUNITY
Start: 2025-04-08

## 2025-04-21 RX ORDER — BUDESONIDE AND FORMOTEROL FUMARATE DIHYDRATE 160; 4.5 UG/1; UG/1
AEROSOL RESPIRATORY (INHALATION)
Qty: 10.2 GRAM | Status: ACTIVE | COMMUNITY

## 2025-04-21 RX ORDER — BUDESONIDE AND FORMOTEROL FUMARATE DIHYDRATE 160; 4.5 UG/1; UG/1
2 PUFFS AEROSOL RESPIRATORY (INHALATION) ONCE A DAY
Qty: 1 KIT | Refills: 3 | Status: ACTIVE | COMMUNITY

## 2025-04-21 RX ORDER — MIDODRINE HYDROCHLORIDE 10 MG/1
1 TABLET TABLET ORAL TWICE A DAY
Status: ACTIVE | COMMUNITY

## 2025-04-21 RX ORDER — PANTOPRAZOLE SODIUM 40 MG/1
1 TABLET 1/2 TO 1 HOUR BEFORE MORNING MEAL TABLET, DELAYED RELEASE ORAL ONCE A DAY
Qty: 90 TABLET | Refills: 1 | OUTPATIENT
Start: 2025-04-21

## 2025-04-21 RX ORDER — PANTOPRAZOLE SODIUM 40 MG/1
1 TABLET 1/2 TO 1 HOUR BEFORE MORNING MEAL TABLET, DELAYED RELEASE ORAL ONCE A DAY
Qty: 90 TABLET | Refills: 1 | Status: ACTIVE | COMMUNITY

## 2025-04-21 RX ORDER — ALBUTEROL SULFATE 90 UG/1
AEROSOL, METERED RESPIRATORY (INHALATION)
Qty: 18 GRAM | Status: ACTIVE | COMMUNITY

## 2025-04-21 RX ORDER — SUCRALFATE 1 G/1
1 TABLET ON AN EMPTY STOMACH TABLET ORAL TWICE A DAY
Qty: 60 | Refills: 2 | Status: ACTIVE | COMMUNITY
Start: 2025-04-08

## 2025-04-21 NOTE — HPI-TODAY'S VISIT:
Malvin Verdugo is a 69 y.o. Black male who was admitted to [facility] on 3/13/2025 for Hematoma of right hip after BM biopsy that was done for thrombocytosis.  He has history of bleeding gastric ulcers. He was treated with epinephrine/clips/ hemo spray.  Currently denies melena /rectal bleeding/hematemesis.  Denies abdominal pain.  He has been compliant with Protonix and Carafate since discharge last time.  Past history of COPD. Shortness of breath has improved today. He is on 2L NC, nebulizers at home, antibiotics.    EGD from 11/2024 non-obstructing Schatzki ring was found at the gastroesophageal junction.        -  One oozing cratered gastric ulcer with a flat pigmented spot (Justin Clas IIc) was found in the prepyloric region of the stomach.  The lesion was 10 mm in largest dimension.  Area was successfully injected with 4 mL of a 0.1 mg/m solution of epinephrine for hemostasis.  To stop active bleeding, two hemostatic clips were successfully placed.  There was no bleeding at the end of the procedure.  For post-intervention hemostasis, three applications of hemostatic spray were used.  There was no bleeding at the end of the procedure.   Clotted blood was found in the gastric fundus.  Lavage of the area was performed using a moderate amount, resulting in incomplete clearance with continued poor visualization.        -  No gross lesions were noted in the entire examined duodenum.  EGD 3/18/25. Normal esophagus. Chronic gastritis, characterized by congestion (edema), erosions, erythema and friability.  Biopsied. Normal examined duodenum. Intestingal metaplasia noted. No dysplasia. Inactive gastritis without Hpylori.  Seen for Hematology f/u 4/16/25 Concern of accelerated phase. - Continue hydroxyurea 1000 mg b.i.d. Platelets now in normal range. - Patient has poor performance status and deemed poor candidate for transplant. - Aspirin held due to low levels of von Willebrand antigen multimers. - Patient's hemoglobin continues to be low at 7.5.  Status post INFeD. 8.4 with labs 4/16/25.  S/p I&D of L Hip Wound. Seen by Surgery for follow up.

## 2025-04-21 NOTE — PHYSICAL EXAM HENT:
Head, normocephalic, atraumatic, Face, Face within normal limits, Ears, External ears within normal limits. NC on supplemental O2

## 2025-07-08 ENCOUNTER — TELEPHONE ENCOUNTER (OUTPATIENT)
Dept: URBAN - METROPOLITAN AREA CLINIC 40 | Facility: CLINIC | Age: 70
End: 2025-07-08

## 2025-07-08 RX ORDER — SUCRALFATE 1 G/1
1 TABLET ON AN EMPTY STOMACH TABLET ORAL TWICE A DAY
Qty: 60 TABLET | Refills: 1

## 2025-08-25 ENCOUNTER — OFFICE VISIT (OUTPATIENT)
Dept: URBAN - METROPOLITAN AREA CLINIC 40 | Facility: CLINIC | Age: 70
End: 2025-08-25